# Patient Record
(demographics unavailable — no encounter records)

---

## 2025-04-22 NOTE — PHYSICAL EXAM
[Person] : oriented to person [Place] : oriented to place [Time] : oriented to time [Fluency] : fluency not intact [Cranial Nerves Optic (II)] : visual acuity intact bilaterally,  visual fields full to confrontation, pupils equal round and reactive to light [Cranial Nerves Oculomotor (III)] : extraocular motion intact [Cranial Nerves Trigeminal (V)] : facial sensation intact symmetrically [Cranial Nerves Vestibulocochlear (VIII)] : hearing was intact bilaterally [Cranial Nerves Glossopharyngeal (IX)] : tongue and palate midline [Cranial Nerves Accessory (XI - Cranial And Spinal)] : head turning and shoulder shrug symmetric [Cranial Nerves Hypoglossal (XII)] : there was no tongue deviation with protrusion [2] : arm extension  2/5 [-4] : hip flexion -4/5 [+4] : knee flexion +4/5 [5] : knee extension 5/5 [0] : ankle plantar flexion 0/5 [Sensation Tactile Decrease] : light touch was intact [3+] : Patella left 3+ [2+] : Ankle jerk right 2+ [4+] : Ankle jerk left 4+ [FreeTextEntry6] : Contracture throughout the left upper and lower extremity, worst at the hands though can be opened fully.  [FreeTextEntry8] : Hemiparetic gait with use of cane.
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
## 2025-04-22 NOTE — ASSESSMENT
[FreeTextEntry1] : This is a 71M with PMH right MCA infarct in 2016 who comes in for follow up.  - C/w ASA 81mg daily - C/w crestor - PT referral  F/u 1 year